# Patient Record
(demographics unavailable — no encounter records)

---

## 2025-01-22 NOTE — PLAN
[Continue IEP] : - Continue services as presently provided for in the Individualized Education Program [ADHD EDU/Behav. Strategies (Gen)] : - Those educational and behavioral strategies known to be helpful to children with ADHD should be implemented in the classroom. [Instruction in Executive Function Skills] : - Direct, individualized instruction in executive function-related skills: i.e. task analysis, planning, organization, study strategies, memorization [Monitor Attention] : - [unfilled]'s attention skills will need to continue to be monitored [Home Behavior Techniques] : - Specific behavioral techniques that can be implemented at home were discussed [[email or web address]] : - [email or web address] - Children and Adults with Attention Deficit Hyperactivity Disorder [[email or web address]] : - [email or web address] - Autism Speaks [Follow-up visit (re-evaluation): _____] : - Follow-up visit in [unfilled]  for re-evaluation. [Teacher BRS] : - Newly completed teacher behavior rating scale(s) [Parent BRS] : - Newly completed parent behavior rating scale [IEP or IFSP] : - Copy of most recent Individualized Education Program (IEP) or Family Service Plan (IFSP) [Test reports] : - Reports of most recent psychological, educational, speech/language, PT, OT test results [Accuracy] : Accuracy and reliability of clinical impressions [Findings (To Date)] : Findings from evaluation (to date) [Clinical Basis] : Clinical basis for current diagnosis and clinical impressions [Differential Diagnosis] : Differential diagnosis [Co-Morbidities] : Clinical disorders and problem commonly associated with this child's condition (now or in the future) [Prognosis] : Prognosis [Resources] : Other available resources [CSE / IEP] : Committee on Special Education (CSE) evaluations and Individualized Education Programs (IEP) [Family Questions] : Family's questions were addressed [Diet] : Evidence-based clinical information about diet [Sleep] : The importance of sleep and strategies to ensure adequate sleep [Injury Prevention] : injury prevention

## 2025-01-22 NOTE — SOCIAL HISTORY
[Mother] : mother [Grandparent(s)] : grandparent(s) [Brother] : brother [de-identified] : [facility] in nursing home. Dad takes him most weekends. [FreeTextEntry2] : Associate Degree in human services

## 2025-01-22 NOTE — REASON FOR VISIT
[Follow-Up Visit] : a follow-up visit for [ADHD] : ADHD [Autism Spectrum Disorder] : autism spectrum disorder [Behavior Problems] : behavior problems [Patient] : patient [Mother] : mother [Medical records] : medical records [Progress with Services] : progress with services [Recommendation for Intervention] : recommendation for intervention

## 2025-01-22 NOTE — HISTORY OF PRESENT ILLNESS
[Public] : Public [SC: _____] : self-contained [unfilled] [IEP] : Individualized Education Program [AU] : Autism [OT: ____] : Occupational Therapy [unfilled] [PT:____] : Physical Therapy [unfilled] [S-L: _____] : Speech/Language Therapy [unfilled] [No Major Concerns] : No major concerns [FreeTextEntry4] : [place] [FreeTextEntry1] : Enrolled in [employer] -s/p Speech at home 3x a week -Home aides M-F [phone]  Sat/Sun 10 hrs -On vacation days he gets 10 hours/day  KENZIE through Medicaid 3 hrs M-F [de-identified] : Doesn't like school but fine when he gets on the bus he is fine No teacher calls or complaints from school Working on teeth brushing viia OT [de-identified] : Mornings are better Gets off the bus at 3pm Has KENZIE and respite services after school Working on toilet training [de-identified] : Knocks the table, noisy when services end. Using mostly single words, some signs and points [de-identified] : In bed 830pm and plays on his phone. Toilet trained but will not wipe. [Major Illness] : no major illness [Major Injury] : no major injury [Surgery] : no surgery [Hospitalizations] : no hospitalizations [New Medications] : no new medication [New Allergies] : no new allergies

## 2025-01-22 NOTE — PHYSICAL EXAM
[Normal] : regular rate and variability; normal S1 and S2; no murmurs [Well-behaved during visit] : well-behaved during visit [Responds to name] : responds to name [Able to follow one step commands] : able to follow one step commands [de-identified] : Left cheek with impetigo patches